# Patient Record
Sex: MALE | Race: WHITE
[De-identification: names, ages, dates, MRNs, and addresses within clinical notes are randomized per-mention and may not be internally consistent; named-entity substitution may affect disease eponyms.]

---

## 2019-01-27 ENCOUNTER — HOSPITAL ENCOUNTER (EMERGENCY)
Dept: HOSPITAL 25 - ED | Age: 74
Discharge: HOME | End: 2019-01-27
Payer: MEDICARE

## 2019-01-27 VITALS — DIASTOLIC BLOOD PRESSURE: 81 MMHG | SYSTOLIC BLOOD PRESSURE: 126 MMHG

## 2019-01-27 DIAGNOSIS — H53.2: ICD-10-CM

## 2019-01-27 DIAGNOSIS — I44.0: ICD-10-CM

## 2019-01-27 DIAGNOSIS — I25.2: ICD-10-CM

## 2019-01-27 DIAGNOSIS — I45.10: ICD-10-CM

## 2019-01-27 DIAGNOSIS — Z79.82: ICD-10-CM

## 2019-01-27 DIAGNOSIS — Z87.891: ICD-10-CM

## 2019-01-27 DIAGNOSIS — Z88.2: ICD-10-CM

## 2019-01-27 DIAGNOSIS — H49.21: Primary | ICD-10-CM

## 2019-01-27 LAB
ALBUMIN SERPL BCG-MCNC: 3.6 G/DL (ref 3.2–5.2)
ALBUMIN/GLOB SERPL: 1.3 {RATIO} (ref 1–3)
ALP SERPL-CCNC: 53 U/L (ref 34–104)
ALT SERPL W P-5'-P-CCNC: 22 U/L (ref 7–52)
ANION GAP SERPL CALC-SCNC: 6 MMOL/L (ref 2–11)
APTT PPP: 32.5 SECONDS (ref 26–36.3)
AST SERPL-CCNC: 19 U/L (ref 13–39)
BASOPHILS # BLD AUTO: 0 10^3/UL (ref 0–0.2)
BUN SERPL-MCNC: 28 MG/DL (ref 6–24)
BUN/CREAT SERPL: 29.8 (ref 8–20)
CALCIUM SERPL-MCNC: 8.8 MG/DL (ref 8.6–10.3)
CHLORIDE SERPL-SCNC: 104 MMOL/L (ref 101–111)
EOSINOPHIL # BLD AUTO: 0.1 10^3/UL (ref 0–0.6)
GLOBULIN SER CALC-MCNC: 2.7 G/DL (ref 2–4)
GLUCOSE SERPL-MCNC: 93 MG/DL (ref 70–100)
HCO3 SERPL-SCNC: 26 MMOL/L (ref 22–32)
HCT VFR BLD AUTO: 32 % (ref 42–52)
HGB BLD-MCNC: 10.6 G/DL (ref 14–18)
INR PPP/BLD: 1.01 (ref 0.77–1.02)
LYMPHOCYTES # BLD AUTO: 1.4 10^3/UL (ref 1–4.8)
MCH RBC QN AUTO: 31 PG (ref 27–31)
MCHC RBC AUTO-ENTMCNC: 33 G/DL (ref 31–36)
MCV RBC AUTO: 95 FL (ref 80–94)
MONOCYTES # BLD AUTO: 0.6 10^3/UL (ref 0–0.8)
NEUTROPHILS # BLD AUTO: 3.1 10^3/UL (ref 1.5–7.7)
NRBC # BLD AUTO: 0 10^3/UL
NRBC BLD QL AUTO: 0
PLATELET # BLD AUTO: 207 10^3/UL (ref 150–450)
POTASSIUM SERPL-SCNC: 4.8 MMOL/L (ref 3.5–5)
PROT SERPL-MCNC: 6.3 G/DL (ref 6.4–8.9)
RBC # BLD AUTO: 3.37 10^6/UL (ref 4–5.4)
SODIUM SERPL-SCNC: 136 MMOL/L (ref 135–145)
WBC # BLD AUTO: 5.3 10^3/UL (ref 3.5–10.8)

## 2019-01-27 PROCEDURE — 85025 COMPLETE CBC W/AUTO DIFF WBC: CPT

## 2019-01-27 PROCEDURE — 83519 RIA NONANTIBODY: CPT

## 2019-01-27 PROCEDURE — 93005 ELECTROCARDIOGRAM TRACING: CPT

## 2019-01-27 PROCEDURE — 96361 HYDRATE IV INFUSION ADD-ON: CPT

## 2019-01-27 PROCEDURE — 85610 PROTHROMBIN TIME: CPT

## 2019-01-27 PROCEDURE — 96360 HYDRATION IV INFUSION INIT: CPT

## 2019-01-27 PROCEDURE — 99283 EMERGENCY DEPT VISIT LOW MDM: CPT

## 2019-01-27 PROCEDURE — 36415 COLL VENOUS BLD VENIPUNCTURE: CPT

## 2019-01-27 PROCEDURE — 70450 CT HEAD/BRAIN W/O DYE: CPT

## 2019-01-27 PROCEDURE — 85730 THROMBOPLASTIN TIME PARTIAL: CPT

## 2019-01-27 PROCEDURE — 80053 COMPREHEN METABOLIC PANEL: CPT

## 2019-01-27 NOTE — XMS REPORT
Continuity of Care Document (CCD)

 Created on:January 3, 2019



Patient:Doc Hernandez

Sex:Male

:1945

External Reference #:[id number]





Demographics







 Address  [address] [place] [id number]

 

 Home Phone  [phone]

 

 Email Address  [email or web address]

 

 Preferred Language  en

 

 Marital Status  Not  or 

 

 Yazdanism Affiliation  Unknown

 

 Race  White

 

 Ethnic Group  Not  or 









Author







 Name  DejesusIna oliveros









Support







 Name  Relationship  Address  Phone

 

 Nneka Hernandez  Unavailable  Unavailable  [phone]









Care Team Providers







 Name  Role  Phone

 

 Benigno Vicente MD  Care Team Information   Unavailable

 

 Benigno Vicente MD  Primary Care Physician  Unavailable









Payers







 Type  Date  Identification Numbers  Payment Provider  Subscriber

 

     Policy Number: [id number]  Medicare  Doc Hernandez









 PayID: [id number]  [address]









 [place] [address]









     Policy Number: [id number]  [facility]  Doc Hernandez









 PayID: [id number]  [address]









 [place] [address]







Advance Directives







 Description

 

 No Information Available







Problems







 Date  Description  Provider  Status

 

 Onset: 2017  Left ventricular systolic  Brandyn Huber MD, [facility],  
Active



   dysfunction  [facility]  

 

 Onset: 2017  Old myocardial infarction  [facility]  
Active



     [facility]  

 

 Onset: 10/26/2017  Acute ST segment elevation  [facility],  
Active



   myocardial infarction involving  [facility]  



   left anterior descending    



   coronary artery    







Family History







 Date  Family Member(s)  Problem(s)  Comments

 

   Father   due to COPD  ()

 

   Mother   due to Breast Cancer  ()

 

   Siblings  4  1 brother , 2 brothers, 1



       with vascular,  and 1 sister,



       lupus living







Social History







 Type  Date  Description  Comments

 

 Birth Sex    Unknown  

 

 Marital Status      

 

 Lives With    Wife  

 

 Cigarette Use    Quit 4 Years Ago  

 

 ETOH Use    Denies alcohol use  

 

 Tobacco Use  Start: Unknown  Heavy tobacco smoker  quit in 



     (more than 10  



     cigarettes/day)  

 

 Recreational Drug Use    Denies Drug Use  

 

 Smoking Status  Reviewed: 19  Heavy tobacco smoker  quit in 



     (more than 10  



     cigarettes/day)  

 

 Exercise Type/Frequency    Exercises regularly  walking 2-3 times a



       day, 30-40 yards,



       golf w/walking 95%



       of time







Allergies, Adverse Reactions, Alerts







 Date  Description  Reaction  Status  Severity  Comments

 

 10/26/2017  Sulfa Antibiotics    Active    

 

 2018  Aldactone    Active    

 

 2019  Spironolactone    Active    hyperkalemia







Medications







 Medication  Date  Status  Form  Strength  Qnty  SIG  Indications  Ordering



                 Provider

 

 Lisinopril    Active  Tablets  20mg  90tabs  1/2 by  I50.22  Benigno MOJICA



   018          mouth    Parrish, DO



             once a    FACC



             day    

 

 Crestor    Active  Tablets  40mg  90tabs  1 by    Benigno MOJICA



   000          mouth    Parrish, DO



             every day    FACC

 

 Metoprolol    Active  Tablets  25mg  180tab  1 by    Benigno MOJICA



 Tartrate  000        s  mouth    Parrish, DO



             twice a    FACC



             day    

 

 Nitrostat    Active  Tablets  0.4mg  25tabs  one sl    Benigno MOJICA



   000    Sub      q5min up    Parrish, DO



             to 3    FAC



             doses as    



             needed    

 

 Latanoprost    Active        daily    Unknown



   000              

 

 Aspirin Adult    Active  Tablets DR  81mg    1 by    Unknown



 Low Dose  000          mouth    



             every day    

 

 Clopidogrel    Active  Tablets  75mg    1 by    Unknown



 Bisulfate  000          mouth    



             every day    

 

                 

 

 Aldactone    Hx  Tablets  25mg  45tabs  1/2 by  I50.22  Benigno MOJICA



   018 -          mouth    Parrish, DO



             daily    FACC



   018              

 

 Lisinopril    Hx  Tablets  10mg  90tabs  1 by    Brandyn Richter -          mouth    Sodums,



             every    LUCIA SHORT 018          morning,    FSCAI



             2 by    



             mouth    



             every    



             evening    

 

 Plavix    Hx  Tablets  75mg  90tabs  1 by    Brandyn Richter -          mouth    Sodums,



             every day    LUCIA SHORT 018              FSCAI

 

 Lisinopril    Hx  Tablets  10mg  180tab  1 by    Brandyn Richter -        s  mouth    Sodums,



             every day    LUCIA SHORT 017          twice    FSCAI



             daily    

 

 Effient  10/26/2  Hx  Tablets  10mg  30tabs  Take 2  I50.22  Brandyn Richter -          tablets    Sodums,



             the first    MD, FACC,



   017          day, then    FSCAI



             1 by    



             mouth    



             every day    

 

 Lovenox  /0  Hx  Solution  60mg/0.6ML    60 mg sc    Unknown



   000 -          twice a    



             day    



   017              

 

 Brilinta  0  Hx  Tablets  90mg    1 tab by    Unknown



   000 -          mouth    



             twice a    



   017          day    

 

 Coumadin  /0  Hx  Tablets  2.5mg    take 1    Unknown



   000 -          tablet    



             daily    



   017          Thu-Mond,    



             12    



             tablet    



             tues. Inr    



             today    

 

 Timolol  0  Hx        prn    Unknown



 Maleate  000 -              



                 



   019              







Immunizations







 Description

 

 No Information Available







Vital Signs







 Date  Vital  Result  Comment

 

 2019  8:21am  Height  68 inches  5'8"









 Weight  160.00 lb  

 

 Heart Rate  68 /min  

 

 BP Systolic Sitting  116 mmHg  Lue reg cuff

 

 BP Diastolic Sitting  64 mmHg  Lue reg cuff

 

 BP Systolic Standing  112 mmHg  Lue reg cuff

 

 BP Diastolic Standing  68 mmHg  Lue reg cuff

 

 BMI (Body Mass Index)  24.3 kg/m2  

 

 Ejection Fraction  35-40%  Echo 2018  8:02am  Height  68 inches  5'8"









 Weight  160.00 lb  w/o shoes

 

 Heart Rate  46 /min  reg.

 

 BP Systolic Sitting  105 mmHg  LA reg cuff

 

 BP Diastolic Sitting  65 mmHg  LA reg cuff

 

 BP Systolic Standing  100 mmHg  LA reg cuff

 

 BP Diastolic Standing  60 mmHg  LA reg cuff

 

 Respiratory Rate  18 /min  

 

 O2 % BldC Oximetry  97 %  w/ oximeter

 

 BMI (Body Mass Index)  24.3 kg/m2  

 

 Ejection Fraction  35-40%  18 echo









 2018  3:01pm  Height  68 inches  5'8"









 Weight  162.00 lb  w/ shoes

 

 Heart Rate  52 /min  irreg

 

 BP Systolic Sitting  104 mmHg  Lue

 

 BP Diastolic Sitting  70 mmHg  Lue

 

 Respiratory Rate  18 /min  

 

 BMI (Body Mass Index)  24.6 kg/m2  

 

 Ejection Fraction  35-40%  as of 18 echo









 05/15/2018  2:49pm  Height  68 inches  5'8"









 Weight  160.00 lb  w/ shoes

 

 Heart Rate  58 /min  

 

 BP Systolic Sitting  114 mmHg  lue reg cuff

 

 BP Diastolic Sitting  64 mmHg  lue reg cuff

 

 BP Systolic Standing  118 mmHg  lue reg cuff

 

 BP Diastolic Standing  66 mmHg  lue reg cuff

 

 Respiratory Rate  18 /min  

 

 BMI (Body Mass Index)  24.3 kg/m2  

 

 Ejection Fraction  34-40%  echo 2018  8:05am  Height  68 inches  5'8"









 Weight  157.00 lb  with shoes

 

 Heart Rate  48 /min  sit and 50 stand

 

 BP Systolic Sitting  90 mmHg  Rue reg cuff

 

 BP Diastolic Sitting  56 mmHg  Rue reg cuff

 

 BP Systolic Standing  102 mmHg  Rue reg cuff

 

 BP Diastolic Standing  60 mmHg  Rue reg cuff

 

 Respiratory Rate  16 /min  

 

 BMI (Body Mass Index)  23.9 kg/m2  

 

 Ejection Fraction  35-40%  date 17 ECHO









 2017  3:24pm  Height  68 inches  5'8"









 Weight  158.00 lb  w/ shoes

 

 Heart Rate  50 /min  

 

 BP Systolic Sitting  128 mmHg  lue reg cuff

 

 BP Diastolic Sitting  62 mmHg  lue reg cuff

 

 BP Systolic Standing  132 mmHg  lue reg cuff

 

 BP Diastolic Standing  68 mmHg  lue reg cuff

 

 Respiratory Rate  18 /min  

 

 BMI (Body Mass Index)  24.0 kg/m2  

 

 Ejection Fraction  35-40%  echo 17  3:39pm  Height  68 inches  5'8"









 Weight  157.00 lb  w/ shoes

 

 Heart Rate  44 /min  

 

 BP Systolic Sitting  118 mmHg  lue reg cuff

 

 BP Diastolic Sitting  62 mmHg  lue reg cuff

 

 BP Systolic Standing  122 mmHg  lue reg cuff

 

 BP Diastolic Standing  74 mmHg  lue reg cuff

 

 Respiratory Rate  18 /min  

 

 BMI (Body Mass Index)  23.9 kg/m2  

 

 Ejection Fraction  30-35%  echo 10/20/17









 10/26/2017  3:06pm  Heart Rate  56 /min  sit and 64 stand









 BP Systolic Sitting  118 mmHg  Rue reg cuff

 

 BP Diastolic Sitting  60 mmHg  Rue reg cuff

 

 BP Systolic Standing  120 mmHg  Rue reg cuff

 

 BP Diastolic Standing  74 mmHg  Rue reg cuff

 

 Respiratory Rate  16 /min  







Results







 Test  Date  Facility  Test  Result  H/L  Range  Note

 

 Basic Metabolic  2018  [facility]  Sodium  136 mmol/L  N  135-
145  1



 Panel    [address]          



     [place] [address] [phone]          









 Potassium  4.7 mmol/L  N  3.5-5.0  

 

 Chloride  104 mmol/L  N  101-111  

 

 Co2 Carbon Dioxide  24 mmol/L  N  22-32  

 

 Anion Gap  8 mmol/L  N  2-11  

 

 Glucose  92 mg/dL  N    

 

 Blood Urea Nitrogen  38 mg/dL  High  6-24  

 

 Creatinine  1.12 mg/dL  N  0.67-1.17  

 

 BUN/Creatinine Ratio  33.9  High  8-20  

 

 Calcium  9.3 mg/dL  N  8.6-10.3  

 

 Egfr Non-  64.3    >60  

 

 Egfr   77.8    >60  2









 Basic Metabolic Panel  2018  [facility]  Sodium  137 mmol/L  
N  135-145  



     [address]  [place] [address] [phone]          









 Potassium  5.3 mmol/L  High  3.5-5.0  

 

 Chloride  109 mmol/L  N  101-111  

 

 Co2 Carbon Dioxide  25 mmol/L  N  22-32  

 

 Anion Gap  3 mmol/L  N  2-11  

 

 Glucose  87 mg/dL  N    

 

 Blood Urea Nitrogen  36 mg/dL  High  6-24  

 

 Creatinine  1.23 mg/dL  High  0.67-1.17  

 

 BUN/Creatinine Ratio  29.3  High  8-20  

 

 Calcium  9.3 mg/dL  N  8.6-10.3  

 

 Egfr Non-  57.7    >60  

 

 Egfr   69.8    >60  3









 Basic Metabolic Panel  2018  [facility]  Sodium  135 mmol/L  
N  135-145  



     [address]  [place] [address] [phone]          









 Potassium  5.4 mmol/L  High  3.5-5.0  

 

 Chloride  106 mmol/L  N  101-111  

 

 Co2 Carbon Dioxide  24 mmol/L  N  22-32  

 

 Anion Gap  5 mmol/L  N  2-11  

 

 Glucose  84 mg/dL  N    

 

 Blood Urea Nitrogen  35 mg/dL  High  6-24  

 

 Creatinine  1.40 mg/dL  High  0.67-1.17  

 

 BUN/Creatinine Ratio  25.0  High  8-20  

 

 Calcium  9.3 mg/dL  N  8.6-10.3  

 

 Egfr Non-  49.7    >60  

 

 Egfr   60.1    >60  4









 Order  2018  [facility]  EKG  <pending>      

 

 Basic Metabolic Panel  2018  [facility]  Sodium  140 mmol/L  
N  135-145  



     [address]  [place] [address] [phone]          









 Potassium  5.4 mmol/L  High  3.5-5.0  

 

 Chloride  109 mmol/L  N  101-111  

 

 Co2 Carbon Dioxide  26 mmol/L  N  22-32  

 

 Anion Gap  5 mmol/L  N  2-11  

 

 Glucose  88 mg/dL  N    

 

 Blood Urea Nitrogen  35 mg/dL  High  6-24  

 

 Creatinine  1.38 mg/dL  High  0.67-1.17  

 

 BUN/Creatinine Ratio  25.4  High  8-20  

 

 Calcium  9.5 mg/dL  N  8.6-10.3  

 

 Egfr Non-  50.5    >60  

 

 Egfr   61.1    >60  5









 Lipid Profile  2018  [facility]  Triglycerides  115 mg/dL    
  6



 (Trig/Chol/HDL)    [address] [place], [place] [id number] [phone]          









 Cholesterol  89 mg/dL      7

 

 HDL Cholesterol  36.8 mg/dL      8

 

 LDL Cholesterol  29 mg/dL      9









 BUN/Creat/GFR  10/30/2018  [facility]  Poc Blood Urea  31 mg/dL  
High  8-26  



     [address]  Nitrogen        



     [place] [address] [phone]          









 Poc Creatinine  1.2 mg/dL  N  0.6-1.3  10

 

 Poc BUN/Creatinine Ratio  25.8  High  8-20  

 

 Egfr Non-  59.3    >60  

 

 Egfr   71.8    >60  11









 Basic Metabolic  05/15/2018  [facility]  Sodium  135 mmol/L  Low  
139-145  



 Panel    [address]  [place] [address] [phone]          









 Potassium  4.7 mmol/L  N  3.5-5.0  

 

 Chloride  102 mmol/L  N  101-111  

 

 Co2 Carbon Dioxide  27 mmol/L  N  22-32  

 

 Anion Gap  6 mmol/L  N  2-11  

 

 Glucose  75 mg/dL  N    

 

 Blood Urea Nitrogen  27 mg/dL  High  6-24  

 

 Creatinine  1.05 mg/dL  N  0.67-1.17  

 

 BUN/Creatinine Ratio  25.7  High  8-20  

 

 Calcium  8.8 mg/dL  N  8.6-10.3  

 

 Egfr Non-  69.4    >60  

 

 Egfr   89.3    >60  12









 Inr/Protime  2017  [facility]  Inr  1.79  High  0.89-1.11  



     [address] [place] [address] [phone]          

 

 Inr/Protime  2017  [facility]  Inr  1.94  High  0.89-1.11  



     [address] [place] [address] [phone]          

 

 Inr/Protime  2017  [facility]  Inr  1.59  High  0.89-1.11  



     [address]  [place] [address] [phone]          

 

 Inr/Protime  2017  [facility]  Inr  1.46  High  0.89-1.11  



     [address] [place] [address] [phone]          

 

 Inr/Protime  2017  [facility]  Inr  2.01  High  0.89-1.11  



     [address] [place] [address] [phone]          

 

 Inr/Protime  2017  [facility]  Inr  3.32  High  0.89-1.11  



     [address] [place] [address] [phone]          

 

 Inr/Protime  10/30/2017  [facility]  Inr  3.65  High  0.89-1.11  



     [address] [place] [address] [phone]          

 

 Inr/Protime  10/26/2017  [facility]  Inr  3.27  High  0.89-1.11  



     [address] [place] [address] [phone]          

 

 Basic Metabolic  10/23/2017  [facility]  Sodium  136 mmol/L  N  133-
145  



 Panel    [address] [place] [address] [phone]          









 Potassium  4.8 mmol/L  N  3.5-5.0  

 

 Chloride  103 mmol/L  N  101-111  

 

 Co2 Carbon Dioxide  28 mmol/L  N  22-32  

 

 Anion Gap  5 mmol/L  N  2-11  

 

 Glucose  95 mg/dL  N    

 

 Blood Urea Nitrogen  30 mg/dL  High  6-24  

 

 Creatinine  1.03 mg/dL  N  0.67-1.17  

 

 BUN/Creatinine Ratio  29.1  High  8-20  

 

 Calcium  9.4 mg/dL  N  8.6-10.3  

 

 Egfr Non-  71.0  N  >60  

 

 Egfr   91.3  N  >60  13









 Inr/Protime  10/23/2017  [facility]  Inr  1.04  N  0.89-1.11  



     [address] [place] [address] [phone]          









 1  To be done in 2 weeks

 

 2  *******Because ethnic data is not always readily available,



   this report includes an eGFR for both -Americans and



   non- Americans.****



   The National Kidney Disease Education Program (NKDEP) does



   not endorse the use of the MDRD equation for patients that



   are not between the ages of 18 and 70, are pregnant, have



   extremes of body size, muscle mass, or nutritional status,



   or are non- or non-.



   According to the National Kidney Foundation, irrespective of



   diagnosis, the stage of the disease is based on the level of



   kidney function:



   Stage Description                      GFR(mL/min/1.73 m(2))



   1     Kidney damage with normal or decreased GFR       90



   2     Kidney damage with mild decrease in GFR          60-89



   3     Moderate decrease in GFR                         30-59



   4     Severe decrease in GFR                           15-29



   5     Kidney failure                       <15 (or dialysis)

 

 3  *******Because ethnic data is not always readily available,



   this report includes an eGFR for both -Americans and



   non- Americans.****



   The National Kidney Disease Education Program (NKDEP) does



   not endorse the use of the MDRD equation for patients that



   are not between the ages of 18 and 70, are pregnant, have



   extremes of body size, muscle mass, or nutritional status,



   or are non- or non-.



   According to the National Kidney Foundation, irrespective of



   diagnosis, the stage of the disease is based on the level of



   kidney function:



   Stage Description                      GFR(mL/min/1.73 m(2))



   1     Kidney damage with normal or decreased GFR       90



   2     Kidney damage with mild decrease in GFR          60-89



   3     Moderate decrease in GFR                         30-59



   4     Severe decrease in GFR                           15-29



   5     Kidney failure                       <15 (or dialysis)

 

 4  *******Because ethnic data is not always readily available,



   this report includes an eGFR for both -Americans and



   non- Americans.****



   The National Kidney Disease Education Program (NKDEP) does



   not endorse the use of the MDRD equation for patients that



   are not between the ages of 18 and 70, are pregnant, have



   extremes of body size, muscle mass, or nutritional status,



   or are non- or non-.



   According to the National Kidney Foundation, irrespective of



   diagnosis, the stage of the disease is based on the level of



   kidney function:



   Stage Description                      GFR(mL/min/1.73 m(2))



   1     Kidney damage with normal or decreased GFR       90



   2     Kidney damage with mild decrease in GFR          60-89



   3     Moderate decrease in GFR                         30-59



   4     Severe decrease in GFR                           15-29



   5     Kidney failure                       <15 (or dialysis)

 

 5  *******Because ethnic data is not always readily available,



   this report includes an eGFR for both -Americans and



   non- Americans.****



   The National Kidney Disease Education Program (NKDEP) does



   not endorse the use of the MDRD equation for patients that



   are not between the ages of 18 and 70, are pregnant, have



   extremes of body size, muscle mass, or nutritional status,



   or are non- or non-.



   According to the National Kidney Foundation, irrespective of



   diagnosis, the stage of the disease is based on the level of



   kidney function:



   Stage Description                      GFR(mL/min/1.73 m(2))



   1     Kidney damage with normal or decreased GFR       90



   2     Kidney damage with mild decrease in GFR          60-89



   3     Moderate decrease in GFR                         30-59



   4     Severe decrease in GFR                           15-29



   5     Kidney failure                       <15 (or dialysis)

 

 6  Desirable: <150



   Borderline High: 150-199



   High: 200-499



   Very High: >500

 

 7  Desirable: <200



   Borderline High: 200-239



   High: >239

 

 8  Low: <40



   Desirable: 40-60



   High: >60

 

 9  Desirable: <100



   Near Optimal: 100-129



   Borderline High: 130-159



   High: 160-189



   Very High: >189

 

 10  : [id number]

 

 11  *******Because ethnic data is not always readily available,



   this report includes an eGFR for both -Americans and



   non- Americans.****



   The National Kidney Disease Education Program (NKDEP) does



   not endorse the use of the MDRD equation for patients that



   are not between the ages of 18 and 70, are pregnant, have



   extremes of body size, muscle mass, or nutritional status,



   or are non- or non-.



   According to the National Kidney Foundation, irrespective of



   diagnosis, the stage of the disease is based on the level of



   kidney function:



   Stage Description                      GFR(mL/min/1.73 m(2))



   1     Kidney damage with normal or decreased GFR       90



   2     Kidney damage with mild decrease in GFR          60-89



   3     Moderate decrease in GFR                         30-59



   4     Severe decrease in GFR                           15-29



   5     Kidney failure                       <15 (or dialysis)

 

 12  *******Because ethnic data is not always readily available,



   this report includes an eGFR for both -Americans and



   non- Americans.****



   The National Kidney Disease Education Program (NKDEP) does



   not endorse the use of the MDRD equation for patients that



   are not between the ages of 18 and 70, are pregnant, have



   extremes of body size, muscle mass, or nutritional status,



   or are non- or non-.



   According to the National Kidney Foundation, irrespective of



   diagnosis, the stage of the disease is based on the level of



   kidney function:



   Stage Description                      GFR(mL/min/1.73 m(2))



   1     Kidney damage with normal or decreased GFR       90



   2     Kidney damage with mild decrease in GFR          60-89



   3     Moderate decrease in GFR                         30-59



   4     Severe decrease in GFR                           15-29



   5     Kidney failure                       <15 (or dialysis)

 

 13  *******Because ethnic data is not always readily available,



   this report includes an eGFR for both -Americans and



   non- Americans.****



   The National Kidney Disease Education Program (NKDEP) does



   not endorse the use of the MDRD equation for patients that



   are not between the ages of 18 and 70, are pregnant, have



   extremes of body size, muscle mass, or nutritional status,



   or are non- or non-.



   According to the National Kidney Foundation, irrespective of



   diagnosis, the stage of the disease is based on the level of



   kidney function:



   Stage Description                      GFR(mL/min/1.73 m(2))



   1     Kidney damage with normal or decreased GFR       90



   2     Kidney damage with mild decrease in GFR          60-89



   3     Moderate decrease in GFR                         30-59



   4     Severe decrease in GFR                           15-29



   5     Kidney failure                       <15 (or dialysis)







Procedures







 Date  Code  Description  Status

 

 2019  43120  EKG Tracing & Interpretation  Completed

 

 2018  41233  EKG Tracing & Interpretation  Completed

 

 2018  16879  ECHO Transthorasic Realtime 2D W Doppler & Color Flow Hosp  
Completed

 

 2017  00773  Echocardiogram, Limited Study  Completed

 

 10/26/2017  88596  EKG Tracing & Interpretation  Completed

 

 10/20/2017  73951  ECHO Transthorasic Realtime 2D W Doppler & Color Flow Hosp  
Completed

 

 10/20/2017  00299  EKG, Interpretation Only  Completed

 

 10/19/2017  37390  EKG, Interpretation Only  Completed

 

 10/19/2017  27382  EKG, Interpretation Only  Completed

 

 10/18/2017  81717  EKG, Interpretation Only  Completed

 

 10/17/2017  50116  Intravascular Blood Flow Velocity  Completed

 

 10/17/2017  37906  Left Heart Cath. Incl S/I Coronaries, Angio S/I V Gram If  
Completed



     Done  

 

 10/17/2017  70159  EKG, Interpretation Only  Completed

 

 10/17/2017  25135  Revascularization Acute Total/Subtotal Occlusion  Completed







Encounters







 Type  Date  Location  Provider  Dx  Diagnosis

 

 Office Visit  2018  [facility]  Benigno Parrish,  I25.2  Old 
myocardial



   8:20a  Of Cma  DO FACC    infarction









 E78.5  Hyperlipidemia, unspecified

 

 I10  Essential (primary) hypertension

 

 I25.5  Ischemic cardiomyopathy

 

 I71.2  Thoracic aortic aneurysm, without rupture

 

 F17.201  Nicotine dependence, unspecified, in remission









 Office Visit  2018  3:20p  [facility]  Brandyn CH  I25.2  Old 
myocardial



     [facility] [place] [facility]  MD Mayo,    infarction



       FACC, FSCAI    









 I50.22  Chronic systolic (congestive) heart failure

 

 I71.2  Thoracic aortic aneurysm, without rupture









 Office Visit  05/15/2018  3:00p  [facility]  Brandyn CH  I25.2  Old 
myocardial



     Of Cma AT [facility] Huber MD,    infarction



       FACC, FSCAI    









 I50.9  Heart failure, unspecified

 

 I71.9  Aortic aneurysm of unspecified site, without rupture









 Office Visit  2018  8:20a  [facility]  Brandyn CH  I25.2  Old 
myocardial



     Of Cma AT [facility] Huber MD,    infarction



       FACC, FSCAI    









 I50.22  Chronic systolic (congestive) heart failure









 Office Visit  2017  3:20p  [facility]  Brandyn CH  I25.2  Old 
myocardial



     Of Cma AT [facility] Huber MD,    infarction



       FACC, FSCAI    









 I50.22  Chronic systolic (congestive) heart failure

 

 I71.9  Aortic aneurysm of unspecified site, without rupture









 Office Visit  2017  3:00p  [facility]  Brandyn CH  I50.22  Chronic 
systolic



     Of Cma AT RADHA Huber MD,    (congestive) heart



       FACC, FSCAI    failure

 

 Office Visit  10/26/2017  3:20p  [facility]  Brandyn CH  I21.02  Stemi 
involving



     Of Cma AT RADHA Huber MD,    left anterior



       FACC, FSCAI    descending



           coronary artery









 I50.22  Chronic systolic (congestive) heart failure









 Office Visit  10/21/2017  1:53p  [facility]  Hieu Saldana  I21.02  
Stemi involving



     [place] [facility]  M.D., FACC,    left anterior



       FSCAI    descending



           coronary artery

 

 Office Visit  10/20/2017  1:51p  [facility]  Hieu Saldana,  I21.02  
Stemi involving



     [place] [facility] [place]  M.[place]., FACC,    left anterior



       FSCAI    descending



           coronary artery

 

 Office Visit  10/19/2017  4:23p  [facility]  Hieu Saldana,  I21.02  
Stemi involving



     Of [place] [facility] [place]  M.D., FACC,    left anterior



       FSCAI    descending



           coronary artery

 

 Office Visit  10/18/2017 10:19a  [facility]  Brandyn CH  I21.02  Stemi 
involving



     Of [place] [facility] [place]  MD Mayo,    left anterior



       FACC, FSCAI    descending



           coronary artery

 

 Office Visit  10/17/2017 12:13p  [facility]  Brandyn CH  I21.02  Stemi 
involving



     Of [place] AT [facility] [place]  MD Mayo,    left anterior



       FACC, FSCAI    descending



           coronary artery







Plan of Treatment

2019 - Benigno Parrish,  FACCI25.2 Old myocardial infarctionComments:It 
has been at least 1 year since the heart attack.  You can stop taking 
clopidogrel (plavix) in anticipation of the surgery.I would recommend you 
continue the aspirin without holding for surgery.  This increases the risk of 
bleeding complications of surgery but probably will decreases the risk havinga 
heart attack during or after surgery.Follow up:f/u as previously [id number] 
Cardiomyopathy, [id number] Ischemic ldxlnyuudopwehW53.2 Thoracic aortic 
aneurysm, without [id number] Nicotine dependence, unspecified, in remission

## 2019-01-27 NOTE — ED
Headache





- HPI Summary


HPI Summary: 


Pt is a 74 y/o male who presents to the ED c/o diplopia. He states he began 

with a headache 2 days ago, but no longer has one. Pt reports intermittent 

vertical diplopia. He denies any vision loss, numbness, weakness, difficulty 

with speech, or unsteady gait. Pt has glaucoma and uses daily eye drops. He had 

cataract surgery 4-5 months ago, and since then denies any change in his 

glasses prescription. Pt had a hernia repair 10 days ago. Pt takes daily ASA.





- History Of Current Complaint


Chief Complaint: EDHeadache


Stated Complaint: HEADACHE


Time Seen by Provider: 01/27/19 11:21


Hx Obtained From: Patient


Onset/Duration: Gradual Onset, Started days ago - 2, Still Present


Currently Pain Is: Current Pain Scale(0-10)= - 0


Timing: Intermittent, Lasting:


Location of Headache: Diffuse


Aggravating Factor: Nothing


Allevating Factors: Nothing


Associated Signs And Symptoms: Visual Changes - Diplopia





- Allergies/Home Medications


Allergies/Adverse Reactions: 


 Allergies











Allergy/AdvReac Type Severity Reaction Status Date / Time


 


Sulfa (Sulfonamide Allergy  Unknown Verified 01/27/19 11:02





Antibiotics)   Reaction  





   Details  











Home Medications: 


 Home Medications





Lisinopril TAB* [Prinivil TAB 5 MG*] 10 mg PO DAILY 01/27/19 [History Confirmed 

01/27/19]


Rosuvastatin Calcium 40 mg PO DAILY 01/27/19 [History Confirmed 01/27/19]











PMH/Surg Hx/FS Hx/Imm Hx


Endocrine/Hematology History: 


   Denies: Hx Diabetes


Cardiovascular History: Reports: Hx Myocardial Infarction


   Denies: Hx Atrial Fibrillation, Hx Hypertension


 History: 


   Denies: Hx Renal Disease


Musculoskeletal History: Reports: Hx Arthritis - bilateral hip replacement


Sensory History: Reports: Hx Cataracts, Hx Contacts or Glasses, Hx Glaucoma


   Denies: Hx Legally Blind, Hx Deafness, Hx Hearing Aid


Opthamlomology History: Reports: Hx Cataracts, Hx Contacts or Glasses, Hx 

Glaucoma


   Denies: Hx Legally Blind





- Cancer History


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


Hx Radiation Therapy: Yes - throat





- Surgical History


Surgery Procedure, Year, and Place: vocal chord cancer removal 2015.  hip 

replacements 1996.  Heart stent 2017.  cataract surgery 2018.  hernia repair 

2019


Infectious Disease History: No


Infectious Disease History: 


   Denies: Traveled Outside the [place] in Last 30 Days





- Family History


Known Family History: 


   Negative: Cardiac Disease - CAD, Other - stroke





- Social History


Alcohol Use: None


Hx Substance Use: No


Substance Use Type: Reports: None


Hx Tobacco Use: Yes


Smoking Status (MU): Former Smoker





Review of Systems


Positive: Diplopia.  Negative: Other - vision loss


Neurological: Other - NEGATIVE: difficulty with speech, unsteady gait


Positive: Headache.  Negative: Weakness, Numbness


All Other Systems Reviewed And Are Negative: Yes





Physical Exam





- Summary


Physical Exam Summary: 


Appearance: Well appearing, no pain distress


Skin: warm, dry, reflects adequate perfusion


Head/face: normal


Eyes: EOMI, CLOVER, binocular double vision, pupils 3 mm bilaterally


ENT: mucous membranes moist


Neck: supple, non-tender


Respiratory: CTA, breath sounds present


Cardiovascular: RRR, pulses symmetrical 


Abdomen: non-tender, soft, surgical scar left inguinal area


Bowel Sounds: present


Musculoskeletal: normal, strength/ROM intact


Neuro: normal, sensory motor intact, A&Ox3


GCS: 15





Triage Information Reviewed: Yes


Vital Signs On Initial Exam: 


 Initial Vitals











Temp Pulse Resp BP Pulse Ox


 


 96.9 F   52   18   151/73   93 


 


 01/27/19 10:58  01/27/19 10:58  01/27/19 10:58  01/27/19 10:58  01/27/19 10:58











Vital Signs Reviewed: Yes





Diagnostics





- Vital Signs


 Vital Signs











  Temp Pulse Resp BP Pulse Ox


 


 01/27/19 10:58  96.9 F  52  18  151/73  93














- Laboratory


Result Diagrams: 


 01/27/19 10:57





 01/27/19 10:57


Lab Statement: Any lab studies that have been ordered have been reviewed, and 

results considered in the medical decision making process.





- CT


  ** Brain CT


CT Interpretation Completed By: Radiologist


Summary of CT Findings: No intracranial mass or hemorrhage is noted. ED 

physician reviewed radiology report.





- EKG


  ** 11:43


Cardiac Rate: Bradycardia - 50 bpm


ST Segment: Non-Specific


Summary of EKG Findings: 1st degree AV block, RBBB





[employer] Institutes Of Health





- NIH Scale


Level of Consciousness: Alert/Keenly Responsive


Ask Patient the Month and His/Her Age: Both Correct


Ask Pt to Open/Close Eyes and /Release Non-Paretic Hand: Both Correctly


Best Gaze (Only Horizontal Eye Movement): Normal


Visual Field Testing: No Visual Loss


Facial Paresis-Pt to Smile & Close Eyes or Grimace Symmetry: Normal/Symmetrical


Motor Function - Right Arm: No Drift-Holds 10 Seconds


Motor Function - Left Arm: No Drift-Holds 10 Seconds


Motor Function - Right Leg: No Drift-Holds 10 Seconds


Motor Function - Left Leg: No Drift-Holds 10 Seconds


Limb Ataxia-Must be out of Proportion to Weakness Present: Absent


Sensory (Use Pinprick to Test Arms/Legs/Trunk/Face): Normal


Best Language (Describe Picture, Name Items): No Aphasia


Dysarthria (Read Several Words): Normal


Extinction and Inattention: No Abnormality


Total Score: 0





Headache Course/Dx





- Course


Course Of Treatment: Nurse's notes reviewed.  Patient with isolated diplopia 

that is binocular nature.  Appears to have some correction with tilting of his 

head to the left.  Concern for a right sixth nerve palsy from possible infarct.

  CT scan and laboratories benign.  Discussed the case with the neurologist who 

wished for the patient be placed on full dose aspirin.  He'll follow him up 

promptly in the office..





- Diagnoses


Differential Diagnosis/HQI/PQRI: Epidural Hematoma, Subdural Hematoma, Migraine

, Other - Myasthenia gravis, cranial nerve lesion


Provider Diagnoses: 


 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








- Physician Notifications


Discussed Care Of Patient With: Chino Lawrence


Time Discussed With Above Provider: 12:35


Instructed by Provider To: Have Pt Call For Appt. - Dr. Lawrence wants to see pt 

this week.





Discharge





- Sign-Out/Discharge


Documenting (check all that apply): Patient Departure - Discharge





- Discharge Plan


Condition: Stable


Disposition: HOME


Prescriptions: 


Aspirin [Aspirin EC] 325 mg PO DAILY #30 tablet.


Patient Education Materials:  Diplopia (ED)


Referrals: 


Benigno Vicente MD [Primary Care Provider] - 


Chino Lawrence MD [Medical Doctor] - 


Additional Instructions: 


Call first thing tomorrow to schedule prompt follow-up with Dr. lawrence the 

neurologist.  We presume you to have a 6th cranial nerve palsy or infarct. 

Return with severe headache, numbness/weakness, worse, new symptoms or other 

concerns as discussed.





- Billing Disposition and Condition


Condition: STABLE


Disposition: Home





- Attestation Statements


Document Initiated by Scribe: Yes


Documenting Scribe: Naz Trujillo


Provider For Whom Scribe is Documenting (Include Credential): MD Mari Porter Attestation: 


I, Naz rTujillo, scribed for Fabian Cormier MD on 01/27/19 at 1822. 


Scribe Documentation Reviewed: Yes


Provider Attestation: 


The documentation as recorded by the scribeNaz accurately reflects the 

service I personally performed and the decisions made by me, Fabian Cormier MD


Status of Scribe Document: Viewed

## 2019-01-31 LAB — MUSK AB SER-SCNC: 0 NMOL/L (ref 0–0.02)
